# Patient Record
(demographics unavailable — no encounter records)

---

## 2024-11-13 NOTE — HISTORY OF PRESENT ILLNESS
[FreeTextEntry1] : Josue Lovelace is a 76-year-old man with a history of hypertension, hyperlipidemia, diabetes mellitus, GERD, depression, obstructive sleep apnea, Rob's esophagus, and obesity who returns for cardiac examination. He has had the recent onset of poorly controlled hypertension and during prior encounters I have titrated his antihypertensive regimen.  He describes fatigue, depression, chronic obsessive-compulsive disorder.  No clear cardiac symptoms (No dyspnea or chest pain); Persistent lower extremity edema. Home blood pressure measurements are often in the 140s to 150s systolic.  Labs from last month reveal hypertriglyceridemia (268) but controlled LDL (82), normal creatinine (1.23) and potassium (4.4).  Urinalysis showed a new proteinuria.

## 2024-11-13 NOTE — PHYSICAL EXAM
[No Acute Distress] : no acute distress [Obese] : obese [Normal S1, S2] : normal S1, S2 [Clear Lung Fields] : clear lung fields [Normal Bowel Sounds] : normal bowel sounds [Normal Gait] : normal gait [Alert and Oriented] : alert and oriented [de-identified] : Central obesity [de-identified] : Mild leg edema, varicose veins

## 2024-11-13 NOTE — REVIEW OF SYSTEMS
[Feeling Fatigued] : feeling fatigued [Weight Loss (___ Lbs)] : no recent weight loss [SOB] : no shortness of breath [Chest Discomfort] : no chest discomfort [Lower Ext Edema] : lower extremity edema [Palpitations] : no palpitations [Depression] : depression [Under Stress] : under stress [Negative] : Heme/Lymph

## 2024-11-13 NOTE — DISCUSSION/SUMMARY
[FreeTextEntry1] :  Hypertension: Blood pressure was initially quite elevated when checked in my office but when rechecked by me using a manual sphygmomanometer during encounter, blood pressure was significantly lower and consistent with satisfactory control.  However, I am concerned that blood pressure may not be ideal since he reports higher numbers at home.  He will bring his automatic blood pressure cuff to his next MD visit (either endocrinologist or primary care physician) to ensure accuracy.  If blood pressure is not at goal (less than 130/80), I would favor titrating his antihypertensive regimen.  Recent onset proteinuria noted--he will discuss management with his primary care physician next month. Given the recent rise in blood pressure and leg edema, I asked him to return for echocardiography.  Obesity: No success with weight loss since I last saw him; we discussed the importance of diet and exercise (it has been many months since he participated in aerobic exercise--in the past he would ride a stationary bicycle in his home regularly).

## 2024-11-13 NOTE — CARDIOLOGY SUMMARY
[de-identified] : 9/11/2024.  Sinus Rhythm with first degree A-V block. Right bundle branch block. [de-identified] : 2022: "Normal" [de-identified] : 2022: "Normal"

## 2024-12-13 NOTE — CARDIOLOGY SUMMARY
[de-identified] : 12/13/2204. Sinus Rhythm with first degree A-V block. Right bundle branch block. [de-identified] : 2022: "Normal" [de-identified] : 11/21/2024. Left ventricular systolic function is normal with ejection fraction estimated at 60 to 65 %. Normal right ventricular function. Aortic valve sclerosis. Trace tricuspid regurgitation.

## 2024-12-13 NOTE — PHYSICAL EXAM
[No Acute Distress] : no acute distress [Obese] : obese [Normal S1, S2] : normal S1, S2 [Clear Lung Fields] : clear lung fields [Normal Bowel Sounds] : normal bowel sounds [Alert and Oriented] : alert and oriented [de-identified] : I/VI murmur [de-identified] : Mild leg edema, varicose veins

## 2024-12-13 NOTE — REVIEW OF SYSTEMS
[Feeling Fatigued] : feeling fatigued [Depression] : depression [Under Stress] : under stress [Negative] : Heme/Lymph [Headache] : no headache [SOB] : no shortness of breath [Chest Discomfort] : no chest discomfort

## 2024-12-13 NOTE — HISTORY OF PRESENT ILLNESS
[FreeTextEntry1] : Josue Lovelace is a 76-year-old man with a history of hypertension, hyperlipidemia, diabetes mellitus, GERD, depression, obstructive sleep apnea, Rob's esophagus, obsessive-compulsive disorder, and obesity who returns for cardiac examination. He has had the recent onset of poorly controlled hypertension.  He feels well -- no new complaints; home BP measurements have improved.  * This visit was conducted as part of ongoing, longitudinal medical care for patient's chronic medical diagnoses and other issues.

## 2024-12-13 NOTE — DISCUSSION/SUMMARY
[With Me] : with me [___ Month(s)] : in [unfilled] month(s) [FreeTextEntry1] :  Hypertension: Significant improvement following recent titration of antihypertensive regimen.  Continue present Rx.  Abnormal ECG: Sinus rhythm with first-degree AV block and right bundle branch block--Andrew similar to prior tracings.

## 2024-12-25 NOTE — PHYSICAL EXAM
[Normal Sclera/Conjunctiva] : normal sclera/conjunctiva [Normal Outer Ear/Nose] : the outer ears and nose were normal in appearance [Normal] : normal rate, regular rhythm, normal S1 and S2 and no murmur heard [No Spinal Tenderness] : no spinal tenderness [Grossly Normal Strength/Tone] : grossly normal strength/tone [No Focal Deficits] : no focal deficits [Normal Gait] : normal gait [Speech Grossly Normal] : speech grossly normal [Normal Affect] : the affect was normal [Alert and Oriented x3] : oriented to person, place, and time [Normal Mood] : the mood was normal [de-identified] : obese

## 2024-12-25 NOTE — HISTORY OF PRESENT ILLNESS
[de-identified] : ANNIKA GENAO is a 75 year M who presents today for follow-up of his hypertension, hyperlipidemia and GERD.. He is feeling well physically He reports marked improvement in his diabetic control as of late-he is currently doing well on an insulin pump. Most recent A1c @ 6.7%. His lipids are improved as well-trigs @ 282 (previously 645), LDL @ 82. His nephropathy screen was positive-all previous nephropathy screens were negative. He is already treated for diabetic nephropathy with both SGLT2 and an ACE I therapy.  Nephropathy screen may have been positive because in OCT -BP was very uncontrolled and likely resulted in renal hyperfiltration.

## 2024-12-25 NOTE — HEALTH RISK ASSESSMENT
[Medical reason not done] : Medical reason not done [Former] : Former [0-4] : 0-4 [> 15 Years] : > 15 Years [de-identified] : pre-existing depression [de-identified] : quit in his 20s

## 2024-12-25 NOTE — HEALTH RISK ASSESSMENT
[Medical reason not done] : Medical reason not done [Former] : Former [0-4] : 0-4 [> 15 Years] : > 15 Years [de-identified] : pre-existing depression [de-identified] : quit in his 20s

## 2024-12-25 NOTE — HISTORY OF PRESENT ILLNESS
[de-identified] : ANNIKA GENAO is a 75 year M who presents today for follow-up of his hypertension, hyperlipidemia and GERD.. He is feeling well physically He reports marked improvement in his diabetic control as of late-he is currently doing well on an insulin pump. Most recent A1c @ 6.7%. His lipids are improved as well-trigs @ 282 (previously 645), LDL @ 82. His nephropathy screen was positive-all previous nephropathy screens were negative. He is already treated for diabetic nephropathy with both SGLT2 and an ACE I therapy.  Nephropathy screen may have been positive because in OCT -BP was very uncontrolled and likely resulted in renal hyperfiltration.

## 2024-12-25 NOTE — PHYSICAL EXAM
[Normal Sclera/Conjunctiva] : normal sclera/conjunctiva [Normal Outer Ear/Nose] : the outer ears and nose were normal in appearance [Normal] : normal rate, regular rhythm, normal S1 and S2 and no murmur heard [No Spinal Tenderness] : no spinal tenderness [Grossly Normal Strength/Tone] : grossly normal strength/tone [No Focal Deficits] : no focal deficits [Normal Gait] : normal gait [Speech Grossly Normal] : speech grossly normal [Normal Affect] : the affect was normal [Alert and Oriented x3] : oriented to person, place, and time [Normal Mood] : the mood was normal [de-identified] : obese

## 2024-12-25 NOTE — PLAN
[FreeTextEntry1] : ..,  THIS VISIT WAS PART OF AN ONGOING LONGITUDAL RELATIONSHIP DESIGNED TO ADDRESS THE MAJORITY OF THE PATIENT'S HEALTH CARE NEEDS WITH CONSISTENCY OVER A LONG PERIOD OF TIME.

## 2025-04-11 NOTE — PHYSICAL EXAM
[Normal Sclera/Conjunctiva] : normal sclera/conjunctiva [Normal Outer Ear/Nose] : the outer ears and nose were normal in appearance [Normal] : no respiratory distress, lungs were clear to auscultation bilaterally and no accessory muscle use [Normal Rate] : normal rate  [Regular Rhythm] : with a regular rhythm [Normal S1, S2] : normal S1 and S2 [Soft] : abdomen soft [Normal Supraclavicular Nodes] : no supraclavicular lymphadenopathy [Normal Anterior Cervical Nodes] : no anterior cervical lymphadenopathy [No CVA Tenderness] : no CVA  tenderness [Grossly Normal Strength/Tone] : grossly normal strength/tone [No Rash] : no rash [Normal Gait] : normal gait [Speech Grossly Normal] : speech grossly normal [Normal Affect] : the affect was normal [Normal Mood] : the mood was normal

## 2025-04-11 NOTE — HEALTH RISK ASSESSMENT
[No falls in past year] : Patient reported no falls in the past year [Former] : Former [0-4] : 0-4 [> 15 Years] : > 15 Years

## 2025-05-22 NOTE — PHYSICAL EXAM
[No Acute Distress] : no acute distress [Obese] : obese [Normal S1, S2] : normal S1, S2 [Clear Lung Fields] : clear lung fields [Normal Bowel Sounds] : normal bowel sounds [Alert and Oriented] : alert and oriented [de-identified] : I/VI murmur, regular [de-identified] : Mild leg edema b/l

## 2025-05-22 NOTE — REVIEW OF SYSTEMS
[Feeling Fatigued] : feeling fatigued [Depression] : depression [Anxiety] : anxiety [Under Stress] : under stress [SOB] : no shortness of breath [Chest Discomfort] : no chest discomfort [Lower Ext Edema] : lower extremity edema [FreeTextEntry2] : No change in weight

## 2025-05-22 NOTE — HISTORY OF PRESENT ILLNESS
[FreeTextEntry1] : Josue Lovelace is a 76-year-old man with a history of hypertension, hyperlipidemia, diabetes mellitus, GERD, depression, obstructive sleep apnea, Rob's esophagus, obsessive-compulsive disorder, and obesity who returns for cardiac examination.  I increased his dose of lisinopril to 40 mg daily on April 4 because of elevated blood pressure and proteinuria; blood pressure was minimally improved when he saw Dr. Hernandez 1 week later.  A metabolic panel showed normal creatinine and estimated GFR; lipids improved but LDL was above goal and ezetimibe was prescribed.  He has no new cardiac complaints--only describes fatigue, depression, and anxiety.  He does not significantly restrict sodium in his diet--plans to  the lunch from a diner on his way home from the office today and tells me that he enjoys eating hot dogs.  * This visit was conducted as part of ongoing, longitudinal medical care for patient's chronic medical diagnoses and other issues.

## 2025-05-22 NOTE — DISCUSSION/SUMMARY
[With Me] : with me [___ Month(s)] : in [unfilled] month(s) [FreeTextEntry1] :  Hypertension: Improving but not at goal.  He is reluctant to add another drug to his already complicated Rx regimen.  We decided to try increasing amlodipine to 2.5 mg twice daily--we discussed that this may worsen his chronic leg edema and I asked him to call me if he does not tolerate this dose titration.  He will continue lisinopril, HCTZ.  Hyperlipidemia: Fair control; ezetimibe recently added to his regimen--tolerating therapy; I encouraged optimization of diet and exercise.

## 2025-05-22 NOTE — CARDIOLOGY SUMMARY
[de-identified] : 4/4/25. Sinus Rhythm with first degree A-V block. Right bundle branch block. [de-identified] : 2022: "Normal" [de-identified] : 11/21/2024. Left ventricular systolic function is normal with ejection fraction estimated at 60 to 65 %. Normal right ventricular function. Aortic valve sclerosis. Trace tricuspid regurgitation.